# Patient Record
Sex: MALE | URBAN - METROPOLITAN AREA
[De-identification: names, ages, dates, MRNs, and addresses within clinical notes are randomized per-mention and may not be internally consistent; named-entity substitution may affect disease eponyms.]

---

## 2017-03-17 ENCOUNTER — INPATIENT (INPATIENT)
Facility: HOSPITAL | Age: 62
LOS: 1 days | Discharge: HOME | End: 2017-03-19
Attending: SURGERY | Admitting: SURGERY
Payer: MEDICAID

## 2017-03-17 VITALS
DIASTOLIC BLOOD PRESSURE: 70 MMHG | SYSTOLIC BLOOD PRESSURE: 114 MMHG | RESPIRATION RATE: 18 BRPM | HEART RATE: 80 BPM | OXYGEN SATURATION: 99 % | TEMPERATURE: 97 F

## 2017-03-17 DIAGNOSIS — S91.312A LACERATION WITHOUT FOREIGN BODY, LEFT FOOT, INITIAL ENCOUNTER: ICD-10-CM

## 2017-03-17 PROBLEM — Z00.00 ENCOUNTER FOR PREVENTIVE HEALTH EXAMINATION: Status: ACTIVE | Noted: 2017-03-17

## 2017-03-17 LAB
ALBUMIN SERPL ELPH-MCNC: 4.1 G/DL — SIGNIFICANT CHANGE UP (ref 3.3–5)
ALP SERPL-CCNC: 55 U/L — SIGNIFICANT CHANGE UP (ref 40–120)
ALT FLD-CCNC: 19 U/L — SIGNIFICANT CHANGE UP (ref 4–41)
APTT BLD: 29.3 SEC — SIGNIFICANT CHANGE UP (ref 27.5–37.4)
AST SERPL-CCNC: 21 U/L — SIGNIFICANT CHANGE UP (ref 4–40)
BILIRUB SERPL-MCNC: 0.3 MG/DL — SIGNIFICANT CHANGE UP (ref 0.2–1.2)
BLD GP AB SCN SERPL QL: NEGATIVE — SIGNIFICANT CHANGE UP
BUN SERPL-MCNC: 11 MG/DL — SIGNIFICANT CHANGE UP (ref 7–23)
CALCIUM SERPL-MCNC: 9.2 MG/DL — SIGNIFICANT CHANGE UP (ref 8.4–10.5)
CHLORIDE SERPL-SCNC: 103 MMOL/L — SIGNIFICANT CHANGE UP (ref 98–107)
CO2 SERPL-SCNC: 23 MMOL/L — SIGNIFICANT CHANGE UP (ref 22–31)
CREAT SERPL-MCNC: 0.83 MG/DL — SIGNIFICANT CHANGE UP (ref 0.5–1.3)
GLUCOSE SERPL-MCNC: 101 MG/DL — HIGH (ref 70–99)
HCT VFR BLD CALC: 41.9 % — SIGNIFICANT CHANGE UP (ref 39–50)
HGB BLD-MCNC: 14.3 G/DL — SIGNIFICANT CHANGE UP (ref 13–17)
INR BLD: 0.98 — SIGNIFICANT CHANGE UP (ref 0.87–1.18)
MCHC RBC-ENTMCNC: 31.1 PG — SIGNIFICANT CHANGE UP (ref 27–34)
MCHC RBC-ENTMCNC: 34.1 % — SIGNIFICANT CHANGE UP (ref 32–36)
MCV RBC AUTO: 91.1 FL — SIGNIFICANT CHANGE UP (ref 80–100)
PLATELET # BLD AUTO: 260 K/UL — SIGNIFICANT CHANGE UP (ref 150–400)
PMV BLD: 8.7 FL — SIGNIFICANT CHANGE UP (ref 7–13)
POTASSIUM SERPL-MCNC: 4.3 MMOL/L — SIGNIFICANT CHANGE UP (ref 3.5–5.3)
POTASSIUM SERPL-SCNC: 4.3 MMOL/L — SIGNIFICANT CHANGE UP (ref 3.5–5.3)
PROT SERPL-MCNC: 7.2 G/DL — SIGNIFICANT CHANGE UP (ref 6–8.3)
PROTHROM AB SERPL-ACNC: 11.2 SEC — SIGNIFICANT CHANGE UP (ref 10–13.1)
RBC # BLD: 4.6 M/UL — SIGNIFICANT CHANGE UP (ref 4.2–5.8)
RBC # FLD: 12.6 % — SIGNIFICANT CHANGE UP (ref 10.3–14.5)
RH IG SCN BLD-IMP: POSITIVE — SIGNIFICANT CHANGE UP
SODIUM SERPL-SCNC: 140 MMOL/L — SIGNIFICANT CHANGE UP (ref 135–145)
WBC # BLD: 6.13 K/UL — SIGNIFICANT CHANGE UP (ref 3.8–10.5)
WBC # FLD AUTO: 6.13 K/UL — SIGNIFICANT CHANGE UP (ref 3.8–10.5)

## 2017-03-17 PROCEDURE — 73630 X-RAY EXAM OF FOOT: CPT | Mod: 26,LT

## 2017-03-17 RX ORDER — TETANUS TOXOID, REDUCED DIPHTHERIA TOXOID AND ACELLULAR PERTUSSIS VACCINE, ADSORBED 5; 2.5; 8; 8; 2.5 [IU]/.5ML; [IU]/.5ML; UG/.5ML; UG/.5ML; UG/.5ML
0.5 SUSPENSION INTRAMUSCULAR ONCE
Qty: 0 | Refills: 0 | Status: COMPLETED | OUTPATIENT
Start: 2017-03-17 | End: 2017-03-17

## 2017-03-17 RX ORDER — SODIUM CHLORIDE 9 MG/ML
1000 INJECTION, SOLUTION INTRAVENOUS
Qty: 0 | Refills: 0 | Status: DISCONTINUED | OUTPATIENT
Start: 2017-03-17 | End: 2017-03-18

## 2017-03-17 RX ORDER — HYDROMORPHONE HYDROCHLORIDE 2 MG/ML
0.5 INJECTION INTRAMUSCULAR; INTRAVENOUS; SUBCUTANEOUS
Qty: 0 | Refills: 0 | Status: DISCONTINUED | OUTPATIENT
Start: 2017-03-17 | End: 2017-03-18

## 2017-03-17 RX ORDER — PIPERACILLIN AND TAZOBACTAM 4; .5 G/20ML; G/20ML
3.38 INJECTION, POWDER, LYOPHILIZED, FOR SOLUTION INTRAVENOUS ONCE
Qty: 0 | Refills: 0 | Status: COMPLETED | OUTPATIENT
Start: 2017-03-17 | End: 2017-03-17

## 2017-03-17 RX ORDER — CEFAZOLIN SODIUM 1 G
1000 VIAL (EA) INJECTION EVERY 8 HOURS
Qty: 0 | Refills: 0 | Status: DISCONTINUED | OUTPATIENT
Start: 2017-03-17 | End: 2017-03-19

## 2017-03-17 RX ORDER — CEFAZOLIN SODIUM 1 G
1000 VIAL (EA) INJECTION ONCE
Qty: 0 | Refills: 0 | Status: COMPLETED | OUTPATIENT
Start: 2017-03-17 | End: 2017-03-17

## 2017-03-17 RX ORDER — PIPERACILLIN AND TAZOBACTAM 4; .5 G/20ML; G/20ML
3.38 INJECTION, POWDER, LYOPHILIZED, FOR SOLUTION INTRAVENOUS EVERY 8 HOURS
Qty: 0 | Refills: 0 | Status: DISCONTINUED | OUTPATIENT
Start: 2017-03-17 | End: 2017-03-19

## 2017-03-17 RX ORDER — HYDROMORPHONE HYDROCHLORIDE 2 MG/ML
1 INJECTION INTRAMUSCULAR; INTRAVENOUS; SUBCUTANEOUS EVERY 6 HOURS
Qty: 0 | Refills: 0 | Status: DISCONTINUED | OUTPATIENT
Start: 2017-03-17 | End: 2017-03-18

## 2017-03-17 RX ORDER — MORPHINE SULFATE 50 MG/1
4 CAPSULE, EXTENDED RELEASE ORAL ONCE
Qty: 0 | Refills: 0 | Status: DISCONTINUED | OUTPATIENT
Start: 2017-03-17 | End: 2017-03-17

## 2017-03-17 RX ORDER — PIPERACILLIN AND TAZOBACTAM 4; .5 G/20ML; G/20ML
3.38 INJECTION, POWDER, LYOPHILIZED, FOR SOLUTION INTRAVENOUS ONCE
Qty: 0 | Refills: 0 | Status: DISCONTINUED | OUTPATIENT
Start: 2017-03-17 | End: 2017-03-17

## 2017-03-17 RX ORDER — OMEPRAZOLE 10 MG/1
1 CAPSULE, DELAYED RELEASE ORAL
Qty: 0 | Refills: 0 | COMMUNITY

## 2017-03-17 RX ADMIN — SODIUM CHLORIDE 75 MILLILITER(S): 9 INJECTION, SOLUTION INTRAVENOUS at 23:14

## 2017-03-17 RX ADMIN — MORPHINE SULFATE 4 MILLIGRAM(S): 50 CAPSULE, EXTENDED RELEASE ORAL at 15:30

## 2017-03-17 RX ADMIN — SODIUM CHLORIDE 75 MILLILITER(S): 9 INJECTION, SOLUTION INTRAVENOUS at 19:47

## 2017-03-17 RX ADMIN — Medication 100 MILLIGRAM(S): at 15:14

## 2017-03-17 RX ADMIN — TETANUS TOXOID, REDUCED DIPHTHERIA TOXOID AND ACELLULAR PERTUSSIS VACCINE, ADSORBED 0.5 MILLILITER(S): 5; 2.5; 8; 8; 2.5 SUSPENSION INTRAMUSCULAR at 15:14

## 2017-03-17 RX ADMIN — HYDROMORPHONE HYDROCHLORIDE 1 MILLIGRAM(S): 2 INJECTION INTRAMUSCULAR; INTRAVENOUS; SUBCUTANEOUS at 19:27

## 2017-03-17 RX ADMIN — HYDROMORPHONE HYDROCHLORIDE 1 MILLIGRAM(S): 2 INJECTION INTRAMUSCULAR; INTRAVENOUS; SUBCUTANEOUS at 19:06

## 2017-03-17 RX ADMIN — MORPHINE SULFATE 4 MILLIGRAM(S): 50 CAPSULE, EXTENDED RELEASE ORAL at 15:14

## 2017-03-17 RX ADMIN — PIPERACILLIN AND TAZOBACTAM 200 GRAM(S): 4; .5 INJECTION, POWDER, LYOPHILIZED, FOR SOLUTION INTRAVENOUS at 19:46

## 2017-03-17 NOTE — H&P ADULT. - ADDITIONAL PE
left hallux laceration to bone, with lacerated tendon of EHL, complete loss of dorsiflexion at the MPJ and IPJ. active bleeding noted. arterial pumping from deep interspace area as well as dorsal arteries. Protective sensation intact, DP/PT 2/4

## 2017-03-17 NOTE — ED PROVIDER NOTE - MEDICAL DECISION MAKING DETAILS
62yo male no pmh p/w 3 cm dorsal surface laceration over IP joint with mild bleeding. Concern for infection to depth of laceration and proximity to joint. Xrays, ancef, tetanus, labs, morphine 4mg and reassess. Podiatry consulted

## 2017-03-17 NOTE — ED PROVIDER NOTE - OBJECTIVE STATEMENT
61M no pmh p/w left 1st toe laceration today. Running circular saw on floor moved and lacerated dorsal aspect of 1st toe. +pain, bleeding. Difficulty ambulating. Denies CP, dyspnea, fevers, nausea. Unknown last tetanus.

## 2017-03-17 NOTE — H&P ADULT. - EXTREMITIES COMMENTS
left hallux laceration to bone, with lacerated tendon of EHL, complete loss of dorsiflexion at the MPJ and IPJ. active bleeding noted. arterial pumping from deep interspace area as well as dorsal arteries. Protective sensation intact.    when examined later the pt's bleeding had mostly stopped.

## 2017-03-17 NOTE — DISCHARGE NOTE ADULT - MEDICATION SUMMARY - MEDICATIONS TO TAKE
I will START or STAY ON the medications listed below when I get home from the hospital:    oxyCODONE 5 mg oral capsule  -- 1 cap(s) by mouth every 6 hours MDD:6  -- Caution federal law prohibits the transfer of this drug to any person other  than the person for whom it was prescribed.  It is very important that you take or use this exactly as directed.  Do not skip doses or discontinue unless directed by your doctor.  May cause drowsiness.  Alcohol may intensify this effect.  Use care when operating dangerous machinery.  This prescription cannot be refilled.  Using more of this medication than prescribed may cause serious breathing problems.    -- Indication: For pain    Cleocin HCl 300 mg oral capsule  -- 1 cap(s) by mouth every 6 hours  -- Finish all this medication unless otherwise directed by prescriber.  Medication should be taken with plenty of water.    -- Indication: For prevent infection    omeprazole 40 mg oral delayed release capsule  -- 1 cap(s) by mouth once a day, As Needed  -- Indication: For reflux    Cipro 500 mg oral tablet  -- 1 tab(s) by mouth 2 times a day  -- Avoid prolonged or excessive exposure to direct and/or artificial sunlight while taking this medication.  Check with your doctor before becoming pregnant.  Do not take dairy products, antacids, or iron preparations within one hour of this medication.  Finish all this medication unless otherwise directed by prescriber.  Medication should be taken with plenty of water.    -- Indication: For prevent infection

## 2017-03-17 NOTE — H&P ADULT. - COMMENTS
left great toe laceration to bone with tendon laceration colonoscopy 8 years ago - within normal limits; endoscopy 8 years ago - showed "acid reflux" according to pt left big toe pain and bleeding

## 2017-03-17 NOTE — DISCHARGE NOTE ADULT - HOSPITAL COURSE
Pt presented to the ED 3/17 after having sustained a laceration to the left great toe secondary to a circular saw accident. Pt was working cutting wood when he accidently cut his toe with a running circular saw. Pt states that the saw went through his work shoe and directyl into his left great toe cutting it. Pt presented to the ED within 2 hours. Pt had last eaten at 2pm.   Upon admission to the ED the pts tetnus status was updated and he recieved a booster. Pt was immediatly put on IV ancef and Zosyn for antibiotic coverage. Pt was seen shortly after by podiatry. Pt was in sever pain. Local injection of 10cc 1% lidocaine plain was given. After further investigation it was determined that the laceration had extended to the level of bone and transected the EHL tendon of the left great toe. Pumping bleeding was noted. Pt had retained protective sensation to the toe. Pt was unable to dorsiflex the toe against force due to the tendon laceration. Pt was admitted and was added on for emergent laceration debridement and tendon repair with ligation of active bleeding as necessary. Pt presented to the ED 3/17 after having sustained a laceration to the left great toe secondary to a circular saw accident. Pt was working cutting wood when he accidently cut his toe with a running circular saw. Pt states that the saw went through his work shoe and directyl into his left great toe cutting it. Pt presented to the ED within 2 hours. Pt had last eaten at 2pm.   Upon admission to the ED the pts tetnus status was updated and he recieved a booster. Pt was immediatly put on IV ancef and Zosyn for antibiotic coverage. Pt was seen shortly after by podiatry. Pt was in sever pain. Local injection of 10cc 1% lidocaine plain was given. After further investigation it was determined that the laceration had extended to the level of bone and transected the EHL tendon of the left great toe. Pumping bleeding was noted. Pt had retained protective sensation to the toe. Pt was unable to dorsiflex the toe against force due to the tendon laceration. Pt was admitted and was added on for emergent laceration debridement and tendon repair with ligation of active bleeding as necessary.  Pt brought to the OR same night for washout and EHL repair. It was noted intra op that the bone was good quality. The tendon was able to be reattached. the wound was closed primarily and pt placed in posterior splint. Strict non weight bearing to the left foot with crutchs. Podiatry attending recommended PO ciprofloxacin and PO clindamycin on d/c fo 7 days. Pt presented to the ED 3/17 after having sustained a laceration to the left great toe secondary to a circular saw accident. Pt was working cutting wood when he accidently cut his toe with a running circular saw. Pt states that the saw went through his work shoe and directyl into his left great toe cutting it. Pt presented to the ED within 2 hours. Pt had last eaten at 2pm.   Upon admission to the ED the pts tetnus status was updated and he recieved a booster. Pt was immediatly put on IV ancef and Zosyn for antibiotic coverage. Pt was seen shortly after by podiatry. Pt was in severe pain. Local injection of 10cc 1% lidocaine plain was given. After further investigation it was determined that the laceration had extended to the level of bone and transected the EHL tendon of the left great toe. Pumping bleeding was noted. Pt had retained protective sensation to the toe. Pt was unable to dorsiflex the toe against force due to the tendon laceration. Pt was admitted and was added on for emergent laceration debridement and tendon repair with ligation of active bleeding as necessary.  Pt brought to the OR same night for washout and EHL repair. It was noted intra op that the bone was good quality. The tendon was able to be reattached. the wound was closed primarily and pt placed in posterior splint. Strict non weight bearing to the left foot with crutches. Podiatry attending recommended PO ciprofloxacin and PO clindamycin on d/c fo 7 days.     Stable for d/c with follow-up above per podiatry.

## 2017-03-17 NOTE — H&P ADULT. - PROBLEM SELECTOR PLAN 1
- pt seen and eval  - IV ancef given and zosyn given. tetnus status updated per ED  -inj 10 cc lidocaine plain in local block  - attempted electrocautery of vessels. attempted deep suture thrown failure. unable to stop bleeding.  - consented for left foot laceration repair and bleeding control  - added on emergently to OR  - admitted under C team for admission -NPO  -Admit to Vascular Surgery  -Pain Control  -Ancef and Zosyn  -Tetanus shot given    As per Podiatry:  - 10 cc of lidocaine injected for a block. attempted electrocautery of vessels. attempted deep suture thrown failure. unable to stop bleeding.  - consented for left foot laceration repair and bleeding control  - added on emergently to OR -NPO  -Admit to Vascular Surgery  -Pain Control  -Ancef and Zosyn (requested by podiatry)  -Tetanus shot given    As per Podiatry:  - 10 cc of lidocaine injected for a block. attempted electrocautery of vessels. attempted deep suture thrown failure. unable to stop bleeding.  - consented for left foot laceration repair and bleeding control  - added on emergently to OR

## 2017-03-17 NOTE — H&P ADULT. - HISTORY OF PRESENT ILLNESS
61y M presented to ED from home after dropping a circular saw on his left foot. Pt dropped the saw on his foot and it went through his shoe and into his foot. Pt noted that there was alot of bleeding immediatly. The laceration extended from the medial distal part of his left great toe and 61y M presented to ED from home after dropping a circular saw on his left foot. Pt dropped the saw on his foot and it went through his shoe and into his foot. Pt noted that there was alot of bleeding immediatly. The laceration extended from the medial distal part of his left great toe and extends to the first interspace. Pt feels that he can move his foot and toe but with pain.    On admission VS 97.4 80 114/70 18 99 61y M presented to ED from home after injuring his left foot with a circular saw (jagged blade). Pt placed the saw on the ground and it was still running. It then "hopped across" and ran over his left foot. The saw went through his shoes and injured his first toe on the left foot. There was a lot of bleeding and pain. Pt never lost sensation or motor in his left toe. ROS otherwise negative. Pt is ambulatory at home.     The laceration extended from the medial distal part of his left great toe and extends to the first interspace.

## 2017-03-17 NOTE — DISCHARGE NOTE ADULT - CARE PROVIDERS DIRECT ADDRESSES
[email or web address]

## 2017-03-17 NOTE — ED ADULT NURSE NOTE - OBJECTIVE STATEMENT
Patient received AA&Ox3 s/p laceration to great toe on left foot this AM with circular saw. VSS on RA. Patient denies chest pain, N/V, fever, chills at this time. 20g PIV in place to right AC, labs drawn, medications administered - will continue to monitor.

## 2017-03-17 NOTE — DISCHARGE NOTE ADULT - CARE PROVIDER_API CALL
Antolin Lan), Surgery; Vascular Surgery  [address] [place] [address]  Phone: [phone]  Fax: [phone]

## 2017-03-17 NOTE — DISCHARGE NOTE ADULT - PATIENT PORTAL LINK FT
“You can access the [facility] Patient Portal, offered by [facility], by registering with the following website: [email or web address]”

## 2017-03-17 NOTE — H&P ADULT. - SKIN
detailed exam laceration and tendon laceration to the dorsum of the laceration and tendon laceration to the dorsum of the left toe

## 2017-03-17 NOTE — DISCHARGE NOTE ADULT - PLAN OF CARE
resolution of laceration Wound Care: Please keep dressing clean dry and intact until follow up  Antibiotics:Please complete antibiotic course as instructed in hospital  Weight Bearing to the left heel as tolerated in surgical shoe  Follow up: Please follow up with Dr Warner within 1 week of discharge [phone] Wound Care: Please keep dressing clean dry and intact until follow up  Antibiotics:Please complete antibiotic course of Ciprofloxacin and Clindamycin as instructed in hospital  Non weight bearing to the left foot  Follow up: Please follow up with Dr Warner within 1 week of discharge [phone]

## 2017-03-17 NOTE — ED PROVIDER NOTE - ATTENDING CONTRIBUTION TO CARE
DR. Rae, ATTENDING MD-  I performed a face to face bedside interview with patient regarding history of present illness, review of symptoms and past medical history. I completed an independent physical exam.  I have discussed patient's plan of care with the resident.   Documentation as above in the note.     60yo with injury to toe by saw, goes down to bone, denies other injuries, unk last tetanus. Exam:  well appearing, in moderate pain,  lungs: CTAB Heart: rrr no murmur Abd: soft, NTND Ext: no pedal edema, alceration over base of first toe  Plan: IV antibiotiocs, pre-op, tetanus, podiatry consult, likely admit for washout.

## 2017-03-17 NOTE — ED ADULT TRIAGE NOTE - CHIEF COMPLAINT QUOTE
Pt states he was cutting a piece of wood and the saw was still on and went through left shoe.  Left great toe appears severed with bleeding.

## 2017-03-17 NOTE — DISCHARGE NOTE ADULT - CARE PLAN
Principal Discharge DX:	Laceration of dorsum of foot, left, initial encounter  Goal:	resolution of laceration  Instructions for follow-up, activity and diet:	Wound Care: Please keep dressing clean dry and intact until follow up  Antibiotics:Please complete antibiotic course as instructed in hospital  Weight Bearing to the left heel as tolerated in surgical shoe  Follow up: Please follow up with Dr Warner within 1 week of discharge [phone] Principal Discharge DX:	Laceration of dorsum of foot, left, initial encounter  Goal:	resolution of laceration  Instructions for follow-up, activity and diet:	Wound Care: Please keep dressing clean dry and intact until follow up  Antibiotics:Please complete antibiotic course of Ciprofloxacin and Clindamycin as instructed in hospital  Non weight bearing to the left foot  Follow up: Please follow up with Dr Warner within 1 week of discharge [phone] Principal Discharge DX:	Laceration of dorsum of foot, left, initial encounter  Goal:	resolution of laceration  Instructions for follow-up, activity and diet:	Wound Care: Please keep dressing clean dry and intact until follow up  Antibiotics:Please complete antibiotic course of Ciprofloxacin and Clindamycin as instructed in hospital  Non weight bearing to the left foot  Follow up: Please follow up with Dr Warner within 1 week of discharge [phone] Principal Discharge DX:	Laceration of dorsum of foot, left, initial encounter  Goal:	resolution of laceration  Instructions for follow-up, activity and diet:	Wound Care: Please keep dressing clean dry and intact until follow up  Antibiotics:Please complete antibiotic course of Ciprofloxacin and Clindamycin as instructed in hospital  Non weight bearing to the left foot  Follow up: Please follow up with Dr Warner within 1 week of discharge [phone] Principal Discharge DX:	Laceration of dorsum of foot, left, initial encounter  Goal:	resolution of laceration  Instructions for follow-up, activity and diet:	Wound Care: Please keep dressing clean dry and intact until follow up  Antibiotics:Please complete antibiotic course of Ciprofloxacin and Clindamycin as instructed in hospital  Non weight bearing to the left foot  Follow up: Please follow up with Dr Warner within 1 week of discharge [phone]

## 2017-03-18 ENCOUNTER — TRANSCRIPTION ENCOUNTER (OUTPATIENT)
Age: 62
End: 2017-03-18

## 2017-03-18 PROCEDURE — 73630 X-RAY EXAM OF FOOT: CPT | Mod: 26,LT

## 2017-03-18 RX ORDER — HYDROMORPHONE HYDROCHLORIDE 2 MG/ML
0.5 INJECTION INTRAMUSCULAR; INTRAVENOUS; SUBCUTANEOUS
Qty: 0 | Refills: 0 | Status: DISCONTINUED | OUTPATIENT
Start: 2017-03-18 | End: 2017-03-18

## 2017-03-18 RX ORDER — ONDANSETRON 8 MG/1
4 TABLET, FILM COATED ORAL ONCE
Qty: 0 | Refills: 0 | Status: DISCONTINUED | OUTPATIENT
Start: 2017-03-18 | End: 2017-03-18

## 2017-03-18 RX ORDER — SODIUM CHLORIDE 9 MG/ML
1000 INJECTION INTRAMUSCULAR; INTRAVENOUS; SUBCUTANEOUS
Qty: 0 | Refills: 0 | Status: DISCONTINUED | OUTPATIENT
Start: 2017-03-18 | End: 2017-03-18

## 2017-03-18 RX ORDER — ACETAMINOPHEN 500 MG
650 TABLET ORAL EVERY 6 HOURS
Qty: 0 | Refills: 0 | Status: DISCONTINUED | OUTPATIENT
Start: 2017-03-18 | End: 2017-03-19

## 2017-03-18 RX ADMIN — HYDROMORPHONE HYDROCHLORIDE 0.5 MILLIGRAM(S): 2 INJECTION INTRAMUSCULAR; INTRAVENOUS; SUBCUTANEOUS at 10:01

## 2017-03-18 RX ADMIN — SODIUM CHLORIDE 100 MILLILITER(S): 9 INJECTION INTRAMUSCULAR; INTRAVENOUS; SUBCUTANEOUS at 02:15

## 2017-03-18 RX ADMIN — PIPERACILLIN AND TAZOBACTAM 25 GRAM(S): 4; .5 INJECTION, POWDER, LYOPHILIZED, FOR SOLUTION INTRAVENOUS at 17:32

## 2017-03-18 RX ADMIN — Medication 100 MILLIGRAM(S): at 22:00

## 2017-03-18 RX ADMIN — HYDROMORPHONE HYDROCHLORIDE 0.5 MILLIGRAM(S): 2 INJECTION INTRAMUSCULAR; INTRAVENOUS; SUBCUTANEOUS at 09:36

## 2017-03-18 RX ADMIN — SODIUM CHLORIDE 100 MILLILITER(S): 9 INJECTION INTRAMUSCULAR; INTRAVENOUS; SUBCUTANEOUS at 07:10

## 2017-03-18 RX ADMIN — Medication 100 MILLIGRAM(S): at 07:10

## 2017-03-18 RX ADMIN — SODIUM CHLORIDE 75 MILLILITER(S): 9 INJECTION, SOLUTION INTRAVENOUS at 00:04

## 2017-03-18 RX ADMIN — Medication 100 MILLIGRAM(S): at 14:48

## 2017-03-18 RX ADMIN — PIPERACILLIN AND TAZOBACTAM 25 GRAM(S): 4; .5 INJECTION, POWDER, LYOPHILIZED, FOR SOLUTION INTRAVENOUS at 03:07

## 2017-03-18 RX ADMIN — HYDROMORPHONE HYDROCHLORIDE 0.5 MILLIGRAM(S): 2 INJECTION INTRAMUSCULAR; INTRAVENOUS; SUBCUTANEOUS at 00:17

## 2017-03-18 RX ADMIN — PIPERACILLIN AND TAZOBACTAM 25 GRAM(S): 4; .5 INJECTION, POWDER, LYOPHILIZED, FOR SOLUTION INTRAVENOUS at 10:15

## 2017-03-18 RX ADMIN — Medication 100 MILLIGRAM(S): at 00:04

## 2017-03-18 RX ADMIN — HYDROMORPHONE HYDROCHLORIDE 0.5 MILLIGRAM(S): 2 INJECTION INTRAMUSCULAR; INTRAVENOUS; SUBCUTANEOUS at 00:04

## 2017-03-18 NOTE — PHYSICAL THERAPY INITIAL EVALUATION ADULT - PLANNED THERAPY INTERVENTIONS, PT EVAL
strengthening/gait training/balance training/ROM/transfer training/Pt left reclined in chair in NAD, LLE elevated, call bell in reach.

## 2017-03-18 NOTE — PHYSICAL THERAPY INITIAL EVALUATION ADULT - ACTIVE RANGE OF MOTION EXAMINATION, REHAB EVAL
except left foot ankle df/pf not assessed due to recent surgery/bilateral upper extremity Active ROM was WFL (within functional limits)/bilateral  lower extremity Active ROM was WFL (within functional limits)

## 2017-03-18 NOTE — PHYSICAL THERAPY INITIAL EVALUATION ADULT - PERTINENT HX OF CURRENT PROBLEM, REHAB EVAL
61y M presented to ED from home after injuring his left foot with a circular saw (jagged blade). Pt placed the saw on the ground and it was still running. It then "hopped across" and ran over his left foot. The saw went through his shoes and injured his first toe on the left foot.

## 2017-03-18 NOTE — BRIEF OPERATIVE NOTE - OPERATION/FINDINGS
left hallux laceration with tendon repair, pulse lavage, debridement of necrotic softissue and bone fragments removed

## 2017-03-19 VITALS
HEART RATE: 70 BPM | RESPIRATION RATE: 18 BRPM | DIASTOLIC BLOOD PRESSURE: 63 MMHG | OXYGEN SATURATION: 100 % | TEMPERATURE: 98 F | SYSTOLIC BLOOD PRESSURE: 109 MMHG

## 2017-03-19 RX ORDER — MOXIFLOXACIN HYDROCHLORIDE TABLETS, 400 MG 400 MG/1
1 TABLET, FILM COATED ORAL
Qty: 14 | Refills: 0 | OUTPATIENT
Start: 2017-03-19 | End: 2017-03-26

## 2017-03-19 RX ORDER — OXYCODONE HYDROCHLORIDE 5 MG/1
1 TABLET ORAL
Qty: 15 | Refills: 0 | OUTPATIENT
Start: 2017-03-19

## 2017-03-19 RX ADMIN — Medication 100 MILLIGRAM(S): at 07:15

## 2017-03-19 RX ADMIN — PIPERACILLIN AND TAZOBACTAM 25 GRAM(S): 4; .5 INJECTION, POWDER, LYOPHILIZED, FOR SOLUTION INTRAVENOUS at 02:34

## 2019-08-07 NOTE — ASU PATIENT PROFILE, ADULT - CIGARETTES, PACKS/DAY
HD treatment today per routine order using newly placed rij tunneled cvc.Patient with  hotn and tachycardia.Required initiation of levo for bp support.Dr Najjar made aware.Net UF removed 1 L.CVC locked with heparin and dressing changed.Report given to primary Rn.   1

## 2020-05-16 NOTE — DISCHARGE NOTE ADULT - NS AS DC PROVIDER CONTACT Y/N MULTI
Adult Ventilation Update    Total Vent Days: 8    Patient Lines/Drains/Airways Status      Active Airway       Name: Placement date: Placement time: Site: Days:    Airway ETT Oral 8.0  05/09/20   --   Oral  7                   +8 30%.    $ FVC / Vital Capacity (liters) : 1.6(forced not following) (05/15/20 0742)  NIF (cm H2O) : -12 (05/15/20 0742)  Rapid Shallow Breathing Index (RR/VT): 26 (05/15/20 0742)  Plateau Pressure: 12 (05/15/20 1951)  Static Compliance (ml / cm H2O): 38.9 (05/16/20 0205)           Yes

## 2023-06-28 NOTE — PHYSICAL THERAPY INITIAL EVALUATION ADULT - WEIGHT-BEARING RESTRICTIONS: SIT/STAND, REHAB EVAL
Patient Experience from recent annual visit with Dr. Nicolas in May.    Q 1. Easy get appt? ANSWER:  yes  Q 2. Wait long to get appt? ANSWER: yes  Q 3. Referrals Easy? ANSWER: yes  Q 4. Dr know result of referral? ANSWER: n/a  Q 5. Review Rxs? ANSWER: yes  Q 6. Dr review labs, xrays, etc.?  ANSWER: yes  Q 7. Insurance cover Rx? ANSWER: yes  Q 8. Dr ask about Balance? ANSWER: yes  Q 9.  Ask about bladder control? ANSWER: no  Q 10.  Ask about exercise level? ANSWER: yes    Other Comments:   LLE/nonweight-bearing
